# Patient Record
Sex: MALE | Race: WHITE | NOT HISPANIC OR LATINO | URBAN - METROPOLITAN AREA
[De-identification: names, ages, dates, MRNs, and addresses within clinical notes are randomized per-mention and may not be internally consistent; named-entity substitution may affect disease eponyms.]

---

## 2020-12-23 ENCOUNTER — OUTPATIENT (OUTPATIENT)
Dept: OUTPATIENT SERVICES | Facility: HOSPITAL | Age: 80
LOS: 1 days | Discharge: HOME | End: 2020-12-23
Payer: MEDICARE

## 2020-12-23 DIAGNOSIS — R10.9 UNSPECIFIED ABDOMINAL PAIN: ICD-10-CM

## 2020-12-23 PROCEDURE — 76775 US EXAM ABDO BACK WALL LIM: CPT | Mod: 26

## 2021-01-19 ENCOUNTER — APPOINTMENT (OUTPATIENT)
Dept: SURGERY | Facility: CLINIC | Age: 81
End: 2021-01-19
Payer: MEDICARE

## 2021-01-19 VITALS — HEIGHT: 65 IN | BODY MASS INDEX: 23.66 KG/M2 | WEIGHT: 142 LBS

## 2021-01-19 PROBLEM — Z00.00 ENCOUNTER FOR PREVENTIVE HEALTH EXAMINATION: Status: ACTIVE | Noted: 2021-01-19

## 2021-01-19 PROCEDURE — 99203 OFFICE O/P NEW LOW 30 MIN: CPT

## 2021-01-19 NOTE — ASSESSMENT
[FreeTextEntry1] : Fahad is a pleasant 80-year-old retired gentleman with no significant past medical history other than smoking one box of Italian cigars daily who presents to the office with intermittent discomfort and significant swelling in the right groin suspicious for a hernia. He enjoys doing physical activity, especially outdoors.\par \par Physical examination demonstrates a large tender plum-sized bulge in the right groin which is easily reducible consistent with a large protruding and intermittently symptomatic right inguinal hernia warranting surgical repair. There is no evidence of incarceration or strangulation, and the patient denies any symptoms of obstruction.  Examination of his left groin demonstrates a mild weakness but no obvious hernia. Both testicles are normal. His umbilical examination demonstrates a strawberry-sized bulge 1 fingerbreadth above the umbilicus which is tender and not reducible consistent with an incarcerated supraumbilical ventral hernia which deserves concomitant repair. His current BMI is 24.\par \par I explained the pros and cons of surgery, as well as all risks, benefits, indications and alternatives of the procedure and the patient understood and agreed. Fahad was scheduled for the repair of his right inguinal hernia with mesh in the repair of his incarcerated supraumbilical ventral hernia with mesh on Friday, February 19, 2021 under LOCAL with IV SEDATION at [facility] with presurgical testing waived.  He was encouraged to avoid heavy lifting and strenuous activity in the interim, of course.\par \par After our discussion, he is aware of the dangers of cigar smoking, especially with regard to wound healing, wound complications, hernia development and hernia recurrence.  He was encouraged to quit or minimize smoking in the perioperative period and has agreed to try.

## 2021-01-19 NOTE — PHYSICAL EXAM
[Normal Breath Sounds] : Normal breath sounds [No Rash or Lesion] : No rash or lesion [Alert] : alert [Calm] : calm [JVD] : no jugular venous distention  [de-identified] : healthy [de-identified] : normal [de-identified] : soft and flat abdomen\par  [de-identified] : normal testicles [de-identified] : right inguinal hernia, reducible; supraumbilical ventral hernia, incarcerated

## 2021-01-19 NOTE — CONSULT LETTER
[Dear  ___] : Dear  [unfilled], [Courtesy Letter:] : I had the pleasure of seeing your patient, [unfilled], in my office today. [Please see my note below.] : Please see my note below. [Consult Closing:] : Thank you very much for allowing me to participate in the care of this patient.  If you have any questions, please do not hesitate to contact me. [FreeTextEntry3] : Respectfully,\par \par Jeff Weaver M.D., FACS\par

## 2021-02-14 ENCOUNTER — OUTPATIENT (OUTPATIENT)
Dept: OUTPATIENT SERVICES | Facility: HOSPITAL | Age: 81
LOS: 1 days | Discharge: HOME | End: 2021-02-14

## 2021-02-14 ENCOUNTER — LABORATORY RESULT (OUTPATIENT)
Age: 81
End: 2021-02-14

## 2021-02-14 DIAGNOSIS — Z11.59 ENCOUNTER FOR SCREENING FOR OTHER VIRAL DISEASES: ICD-10-CM

## 2021-02-16 NOTE — ASU PATIENT PROFILE, ADULT - NSSUBSTANCEUSE_GEN_ALL_CORE_SD
Health Maintenance Due   Topic Date Due   • Cervical Cancer Screening  10/01/2014       Patient is due for topics as listed above but is not proceeding with Cervical cancer screening at this time.          never used

## 2021-02-17 ENCOUNTER — RESULT REVIEW (OUTPATIENT)
Age: 81
End: 2021-02-17

## 2021-02-17 ENCOUNTER — OUTPATIENT (OUTPATIENT)
Dept: OUTPATIENT SERVICES | Facility: HOSPITAL | Age: 81
LOS: 1 days | Discharge: HOME | End: 2021-02-17
Payer: MEDICARE

## 2021-02-17 ENCOUNTER — APPOINTMENT (OUTPATIENT)
Dept: SURGERY | Facility: AMBULATORY SURGERY CENTER | Age: 81
End: 2021-02-17
Payer: MEDICARE

## 2021-02-17 VITALS
TEMPERATURE: 98 F | OXYGEN SATURATION: 100 % | RESPIRATION RATE: 18 BRPM | HEART RATE: 60 BPM | SYSTOLIC BLOOD PRESSURE: 145 MMHG | DIASTOLIC BLOOD PRESSURE: 69 MMHG

## 2021-02-17 VITALS
HEIGHT: 65 IN | HEART RATE: 60 BPM | DIASTOLIC BLOOD PRESSURE: 77 MMHG | SYSTOLIC BLOOD PRESSURE: 155 MMHG | RESPIRATION RATE: 19 BRPM | TEMPERATURE: 98 F | WEIGHT: 141.98 LBS | OXYGEN SATURATION: 100 %

## 2021-02-17 DIAGNOSIS — Z90.89 ACQUIRED ABSENCE OF OTHER ORGANS: Chronic | ICD-10-CM

## 2021-02-17 PROCEDURE — 88304 TISSUE EXAM BY PATHOLOGIST: CPT | Mod: 26

## 2021-02-17 PROCEDURE — 55520 REMOVAL OF SPERM CORD LESION: CPT | Mod: RT,59

## 2021-02-17 PROCEDURE — 49505 PRP I/HERN INIT REDUC >5 YR: CPT | Mod: RT

## 2021-02-17 PROCEDURE — 49561: CPT

## 2021-02-17 PROCEDURE — 49568: CPT | Mod: 59

## 2021-02-17 RX ORDER — SODIUM CHLORIDE 9 MG/ML
1000 INJECTION, SOLUTION INTRAVENOUS
Refills: 0 | Status: DISCONTINUED | OUTPATIENT
Start: 2021-02-17 | End: 2021-03-03

## 2021-02-17 RX ORDER — TRAMADOL HYDROCHLORIDE 50 MG/1
1 TABLET ORAL
Qty: 30 | Refills: 0
Start: 2021-02-17 | End: 2021-02-21

## 2021-02-17 RX ORDER — HYDROMORPHONE HYDROCHLORIDE 2 MG/ML
0.5 INJECTION INTRAMUSCULAR; INTRAVENOUS; SUBCUTANEOUS
Refills: 0 | Status: DISCONTINUED | OUTPATIENT
Start: 2021-02-17 | End: 2021-02-17

## 2021-02-17 RX ORDER — ONDANSETRON 8 MG/1
4 TABLET, FILM COATED ORAL ONCE
Refills: 0 | Status: DISCONTINUED | OUTPATIENT
Start: 2021-02-17 | End: 2021-03-03

## 2021-02-17 RX ORDER — OXYCODONE AND ACETAMINOPHEN 5; 325 MG/1; MG/1
1 TABLET ORAL EVERY 4 HOURS
Refills: 0 | Status: DISCONTINUED | OUTPATIENT
Start: 2021-02-17 | End: 2021-02-17

## 2021-02-17 RX ADMIN — SODIUM CHLORIDE 100 MILLILITER(S): 9 INJECTION, SOLUTION INTRAVENOUS at 10:58

## 2021-02-17 NOTE — CHART NOTE - NSCHARTNOTEFT_GEN_A_CORE
PACU ANESTHESIA ADMISSION NOTE      Procedure: Repair of incarcerated ventral hernia with mesh    Repair of right inguinal hernia with mesh      Post op diagnosis:  Incarcerated ventral hernia    Inguinal hernia, right        ____  Intubated  TV:______       Rate: ______      FiO2: ______    __x__  Patent Airway    __x__  Full return of protective reflexes    __x__  Full recovery from anesthesia / back to baseline     Vitals:   See Anesthesia record  T- 98.0 P-55 R- 15 B/P- 109/58 SPO2- 95% on RA    Mental Status:  __x__ Awake   _____ Alert   __x___ Drowsy   _____ Sedated    Nausea/Vomiting:  __x__ NO  ______Yes,   See Post - Op Orders          Pain Scale (0-10):  ___0__    Treatment: ____ None    ____ See Post - Op/PCA Orders    Post - Operative Fluids:   ____ Oral   __x__ See Post - Op Orders    Plan: Discharge:   __x__Home       _____Floor     _____Critical Care    _____  Other:_________________    Comments: No anesthesia complications/issues noted. Discharge to HOME when criteria met.

## 2021-02-17 NOTE — ASU DISCHARGE PLAN (ADULT/PEDIATRIC) - DISCHARGE TO
Completed a ED CM readmission questionnaire for COPD/ CHF.  Mickie Rankin RN.6/5/2017.9:48 PM.  
Home

## 2021-02-17 NOTE — BRIEF OPERATIVE NOTE - NSICDXBRIEFPREOP_GEN_ALL_CORE_FT
PRE-OP DIAGNOSIS:  Incarcerated ventral hernia 17-Feb-2021 09:04:50  Jeff Weaver  Inguinal hernia, right 17-Feb-2021 09:04:47  Jeff Weaver

## 2021-02-17 NOTE — BRIEF OPERATIVE NOTE - NSICDXBRIEFPROCEDURE_GEN_ALL_CORE_FT
PROCEDURES:  Repair of incarcerated ventral hernia with mesh 17-Feb-2021 09:05:02  Jeff Weaver  Repair of right inguinal hernia with mesh 17-Feb-2021 09:05:00  Jeff Weaver

## 2021-02-17 NOTE — BRIEF OPERATIVE NOTE - NSICDXBRIEFPOSTOP_GEN_ALL_CORE_FT
POST-OP DIAGNOSIS:  Incarcerated ventral hernia 17-Feb-2021 09:04:56  Jeff Weaver  Inguinal hernia, right 17-Feb-2021 09:04:53  Jeff Weaver

## 2021-02-17 NOTE — PRE-ANESTHESIA EVALUATION ADULT - NSANTHOSAYNRD_GEN_A_CORE
No. TATYANA screening performed.  STOP BANG Legend: 0-2 = LOW Risk; 3-4 = INTERMEDIATE Risk; 5-8 = HIGH Risk

## 2021-02-17 NOTE — ASU DISCHARGE PLAN (ADULT/PEDIATRIC) - CARE PROVIDER_API CALL
Jeff Weaver)  Surgery  [address]  [place] [address]  Phone: [phone]  Fax: [phone]  Scheduled Appointment: 02/25/2021

## 2021-02-19 LAB — SURGICAL PATHOLOGY STUDY: SIGNIFICANT CHANGE UP

## 2021-02-20 DIAGNOSIS — K40.90 UNILATERAL INGUINAL HERNIA, WITHOUT OBSTRUCTION OR GANGRENE, NOT SPECIFIED AS RECURRENT: ICD-10-CM

## 2021-02-20 DIAGNOSIS — D17.6 BENIGN LIPOMATOUS NEOPLASM OF SPERMATIC CORD: ICD-10-CM

## 2021-02-20 DIAGNOSIS — F17.200 NICOTINE DEPENDENCE, UNSPECIFIED, UNCOMPLICATED: ICD-10-CM

## 2021-02-20 DIAGNOSIS — K43.6 OTHER AND UNSPECIFIED VENTRAL HERNIA WITH OBSTRUCTION, WITHOUT GANGRENE: ICD-10-CM

## 2021-02-23 ENCOUNTER — APPOINTMENT (OUTPATIENT)
Dept: SURGERY | Facility: CLINIC | Age: 81
End: 2021-02-23
Payer: MEDICARE

## 2021-02-23 DIAGNOSIS — K43.6 OTHER AND UNSPECIFIED VENTRAL HERNIA WITH OBSTRUCTION, W/OUT GANGRENE: ICD-10-CM

## 2021-02-23 DIAGNOSIS — K40.90 UNILATERAL INGUINAL HERNIA, W/OUT OBSTRUCTION OR GANGRENE, NOT SPECIFIED AS RECURRENT: ICD-10-CM

## 2021-02-23 PROBLEM — H40.9 UNSPECIFIED GLAUCOMA: Chronic | Status: ACTIVE | Noted: 2021-02-17

## 2021-02-23 PROCEDURE — 99024 POSTOP FOLLOW-UP VISIT: CPT

## 2021-02-23 NOTE — ASSESSMENT
[FreeTextEntry1] : Fahad underwent the repair of his very large direct right inguinal hernia with mesh and repair of his incarcerated supraumbilical ventral hernia with mesh on February 17, 2021 under local with IV sedation without any problems or complications. His wound is clean, dry and intact. There is no evidence of erythema, seroma formation or infection. He is tolerating a diet and having normal bowel movements. He denies any significant postoperative pain or discomfort at this time.\par \par He was counseled and reassured. Fahad was discharged from the office with no specific followup necessary, but he knows to avoid any heavy lifting or strenuous activity for the next several weeks. He was encouraged to continue to wear his abdominal binder for the better part of the next month.

## 2021-02-23 NOTE — CONSULT LETTER
[FreeTextEntry1] : Dear Dr. Juan Alberto Fernandez, \par \par I had the pleasure of seeing your patient, CHRISTINA CROCKETT, in my office today. Please see my note below. \par \par Thank you very much for allowing me to participate in the care of this patient. If you have any questions, please do not hesitate to contact me. \par \par \par Respectfully,\par \par Jeff Weaver M.D., FACS

## 2021-02-25 ENCOUNTER — APPOINTMENT (OUTPATIENT)
Dept: SURGERY | Facility: CLINIC | Age: 81
End: 2021-02-25

## 2021-05-04 ENCOUNTER — RESULT REVIEW (OUTPATIENT)
Age: 81
End: 2021-05-04

## 2021-05-04 ENCOUNTER — OUTPATIENT (OUTPATIENT)
Dept: OUTPATIENT SERVICES | Facility: HOSPITAL | Age: 81
LOS: 1 days | Discharge: HOME | End: 2021-05-04
Payer: MEDICARE

## 2021-05-04 VITALS
OXYGEN SATURATION: 97 % | HEIGHT: 65 IN | HEART RATE: 60 BPM | SYSTOLIC BLOOD PRESSURE: 170 MMHG | RESPIRATION RATE: 16 BRPM | TEMPERATURE: 96 F | WEIGHT: 149.91 LBS | DIASTOLIC BLOOD PRESSURE: 80 MMHG

## 2021-05-04 DIAGNOSIS — M17.11 UNILATERAL PRIMARY OSTEOARTHRITIS, RIGHT KNEE: ICD-10-CM

## 2021-05-04 DIAGNOSIS — Z01.818 ENCOUNTER FOR OTHER PREPROCEDURAL EXAMINATION: ICD-10-CM

## 2021-05-04 DIAGNOSIS — Z90.89 ACQUIRED ABSENCE OF OTHER ORGANS: Chronic | ICD-10-CM

## 2021-05-04 LAB
A1C WITH ESTIMATED AVERAGE GLUCOSE RESULT: 5.8 % — HIGH (ref 4–5.6)
ALBUMIN SERPL ELPH-MCNC: 4.5 G/DL — SIGNIFICANT CHANGE UP (ref 3.5–5.2)
ALP SERPL-CCNC: 82 U/L — SIGNIFICANT CHANGE UP (ref 30–115)
ALT FLD-CCNC: 10 U/L — SIGNIFICANT CHANGE UP (ref 0–41)
ANION GAP SERPL CALC-SCNC: 12 MMOL/L — SIGNIFICANT CHANGE UP (ref 7–14)
APTT BLD: 31.2 SEC — SIGNIFICANT CHANGE UP (ref 27–39.2)
AST SERPL-CCNC: 18 U/L — SIGNIFICANT CHANGE UP (ref 0–41)
BASOPHILS # BLD AUTO: 0.07 K/UL — SIGNIFICANT CHANGE UP (ref 0–0.2)
BASOPHILS NFR BLD AUTO: 1 % — SIGNIFICANT CHANGE UP (ref 0–1)
BILIRUB SERPL-MCNC: 0.6 MG/DL — SIGNIFICANT CHANGE UP (ref 0.2–1.2)
BUN SERPL-MCNC: 16 MG/DL — SIGNIFICANT CHANGE UP (ref 10–20)
CALCIUM SERPL-MCNC: 9.7 MG/DL — SIGNIFICANT CHANGE UP (ref 8.5–10.1)
CHLORIDE SERPL-SCNC: 101 MMOL/L — SIGNIFICANT CHANGE UP (ref 98–110)
CO2 SERPL-SCNC: 23 MMOL/L — SIGNIFICANT CHANGE UP (ref 17–32)
CREAT SERPL-MCNC: 0.7 MG/DL — SIGNIFICANT CHANGE UP (ref 0.7–1.5)
EOSINOPHIL # BLD AUTO: 0.29 K/UL — SIGNIFICANT CHANGE UP (ref 0–0.7)
EOSINOPHIL NFR BLD AUTO: 4 % — SIGNIFICANT CHANGE UP (ref 0–8)
ESTIMATED AVERAGE GLUCOSE: 120 MG/DL — HIGH (ref 68–114)
GLUCOSE SERPL-MCNC: 103 MG/DL — HIGH (ref 70–99)
HCT VFR BLD CALC: 38.4 % — LOW (ref 42–52)
HGB BLD-MCNC: 13.6 G/DL — LOW (ref 14–18)
IMM GRANULOCYTES NFR BLD AUTO: 0.5 % — HIGH (ref 0.1–0.3)
INR BLD: 0.99 RATIO — SIGNIFICANT CHANGE UP (ref 0.65–1.3)
LYMPHOCYTES # BLD AUTO: 1.63 K/UL — SIGNIFICANT CHANGE UP (ref 1.2–3.4)
LYMPHOCYTES # BLD AUTO: 22.3 % — SIGNIFICANT CHANGE UP (ref 20.5–51.1)
MCHC RBC-ENTMCNC: 30.4 PG — SIGNIFICANT CHANGE UP (ref 27–31)
MCHC RBC-ENTMCNC: 35.4 G/DL — SIGNIFICANT CHANGE UP (ref 32–37)
MCV RBC AUTO: 85.7 FL — SIGNIFICANT CHANGE UP (ref 80–94)
MONOCYTES # BLD AUTO: 0.75 K/UL — HIGH (ref 0.1–0.6)
MONOCYTES NFR BLD AUTO: 10.2 % — HIGH (ref 1.7–9.3)
MRSA PCR RESULT.: NEGATIVE — SIGNIFICANT CHANGE UP
NEUTROPHILS # BLD AUTO: 4.54 K/UL — SIGNIFICANT CHANGE UP (ref 1.4–6.5)
NEUTROPHILS NFR BLD AUTO: 62 % — SIGNIFICANT CHANGE UP (ref 42.2–75.2)
NRBC # BLD: 0 /100 WBCS — SIGNIFICANT CHANGE UP (ref 0–0)
PLATELET # BLD AUTO: 226 K/UL — SIGNIFICANT CHANGE UP (ref 130–400)
POTASSIUM SERPL-MCNC: 4.4 MMOL/L — SIGNIFICANT CHANGE UP (ref 3.5–5)
POTASSIUM SERPL-SCNC: 4.4 MMOL/L — SIGNIFICANT CHANGE UP (ref 3.5–5)
PROT SERPL-MCNC: 7.4 G/DL — SIGNIFICANT CHANGE UP (ref 6–8)
PROTHROM AB SERPL-ACNC: 11.4 SEC — SIGNIFICANT CHANGE UP (ref 9.95–12.87)
RBC # BLD: 4.48 M/UL — LOW (ref 4.7–6.1)
RBC # FLD: 12 % — SIGNIFICANT CHANGE UP (ref 11.5–14.5)
SODIUM SERPL-SCNC: 136 MMOL/L — SIGNIFICANT CHANGE UP (ref 135–146)
WBC # BLD: 7.32 K/UL — SIGNIFICANT CHANGE UP (ref 4.8–10.8)
WBC # FLD AUTO: 7.32 K/UL — SIGNIFICANT CHANGE UP (ref 4.8–10.8)

## 2021-05-04 PROCEDURE — 71046 X-RAY EXAM CHEST 2 VIEWS: CPT | Mod: 26

## 2021-05-04 PROCEDURE — 72170 X-RAY EXAM OF PELVIS: CPT | Mod: 26

## 2021-05-04 PROCEDURE — 73562 X-RAY EXAM OF KNEE 3: CPT | Mod: 26,RT

## 2021-05-04 PROCEDURE — 93010 ELECTROCARDIOGRAM REPORT: CPT

## 2021-05-04 NOTE — H&P PST ADULT - REASON FOR ADMISSION
right total knee replacement scheduled on 5/ 19 under regional anesthesia at [facility] by Dr Edmonds

## 2021-05-04 NOTE — H&P PST ADULT - HISTORY OF PRESENT ILLNESS
79 yo male presents for PAST in preparation for right total knee replacement scheduled on 5/ 19  Pt complains of intermittent chronic RIGHT knee pain for 8 years. Pt reports pain 7 /10 with rest, and  9/10 with activity. Pt takes  nothing for pain.  Pain is localized and doesn't  radiates to other parts of the body. Associated symptom  is decrease mobility.    Anesthesia Alert  NO--Difficult Airway  NO--History of neck surgery or radiation  NO--Limited ROM of neck  NO--History of Malignant hyperthermia  NO--Personal or family history of Pseudocholinesterase deficiency.  NO--Prior Anesthesia Complication  NO--Latex Allergy  NO--Loose teeth  NO--History of Rheumatoid Arthritis  NO--TATYANA  NO--Bleeding risk  NO--Other     written and verbal instructions with teach back on chlorhexidine shampoo provided,  pt verbalized understanding with returned demonstration  Pt instructed to stop vitamins/supplements/herbal medications/ASA/NSAIDS for one week prior to surgery and discuss with PMD.  Patient verbalized understanding of instructions and was given the opportunity to ask questions and have them answered.     79 yo male presents for PAST in preparation for right total knee replacement scheduled on 5/ 19  Pt complains of intermittent chronic RIGHT knee pain for 8 years. Pt reports pain 7 /10 with rest, and  9/10 with activity. Pt takes  nothing for pain, but uses ice packs with some relief.  Pain is localized and doesn't  radiates to other parts of the body. Associated symptom  is decrease mobility.    Anesthesia Alert  NO--Difficult Airway  NO--History of neck surgery or radiation  NO--Limited ROM of neck  NO--History of Malignant hyperthermia  NO--Personal or family history of Pseudocholinesterase deficiency.  NO--Prior Anesthesia Complication  NO--Latex Allergy  NO--Loose teeth  NO--History of Rheumatoid Arthritis  NO--ATTYANA  NO--Bleeding risk  NO--Other     written and verbal instructions with teach back on chlorhexidine shampoo provided,  pt verbalized understanding with returned demonstration  Pt instructed to stop vitamins/supplements/herbal medications/ASA/NSAIDS for one week prior to surgery and discuss with PMD.  Patient verbalized understanding of instructions and was given the opportunity to ask questions and have them answered.

## 2021-05-04 NOTE — H&P PST ADULT - NSICDXPASTMEDICALHX_GEN_ALL_CORE_FT
PAST MEDICAL HISTORY:  Glaucoma (increased eye pressure) bilateral eyes    H/O inguinal hernia     Hernia, umbilical     Right knee pain      PAST MEDICAL HISTORY:  Glaucoma (increased eye pressure) bilateral eyes    H/O inguinal hernia     Hernia, umbilical     [place] (hard of hearing)     Right knee pain

## 2021-05-04 NOTE — H&P PST ADULT - RS GEN PE MLT RESP DETAILS PC
airway patent/breath sounds equal/good air movement/respirations non-labored/clear to auscultation bilaterally/no chest wall tenderness/no intercostal retractions/no rales

## 2021-05-13 NOTE — ASU PREOP CHECKLIST - WEIGHT IN KG
64.4 Anesthesia Type: 2% lidocaine with epinephrine mixed 1:10 with a solution of 8.4% sodium bicarbonate and 0.25% marcaine with epinephrine

## 2021-05-16 ENCOUNTER — OUTPATIENT (OUTPATIENT)
Dept: OUTPATIENT SERVICES | Facility: HOSPITAL | Age: 81
LOS: 1 days | Discharge: HOME | End: 2021-05-16

## 2021-05-16 DIAGNOSIS — Z90.89 ACQUIRED ABSENCE OF OTHER ORGANS: Chronic | ICD-10-CM

## 2021-05-16 DIAGNOSIS — Z11.59 ENCOUNTER FOR SCREENING FOR OTHER VIRAL DISEASES: ICD-10-CM

## 2021-05-16 PROBLEM — K42.9 UMBILICAL HERNIA WITHOUT OBSTRUCTION OR GANGRENE: Chronic | Status: ACTIVE | Noted: 2021-05-04

## 2021-05-16 PROBLEM — Z87.19 PERSONAL HISTORY OF OTHER DISEASES OF THE DIGESTIVE SYSTEM: Chronic | Status: ACTIVE | Noted: 2021-05-04

## 2021-05-16 PROBLEM — M25.561 PAIN IN RIGHT KNEE: Chronic | Status: ACTIVE | Noted: 2021-05-04

## 2021-05-16 PROBLEM — H91.90 UNSPECIFIED HEARING LOSS, UNSPECIFIED EAR: Chronic | Status: ACTIVE | Noted: 2021-05-04

## 2021-05-18 ENCOUNTER — FORM ENCOUNTER (OUTPATIENT)
Age: 81
End: 2021-05-18

## 2021-05-18 NOTE — ASU PATIENT PROFILE, ADULT - PMH
Glaucoma (increased eye pressure)  bilateral eyes  H/O inguinal hernia    Hernia, umbilical    [place] (hard of hearing)    Right knee pain

## 2021-05-19 ENCOUNTER — OUTPATIENT (OUTPATIENT)
Dept: OUTPATIENT SERVICES | Facility: HOSPITAL | Age: 81
LOS: 1 days | Discharge: HOME | End: 2021-05-19

## 2021-05-19 ENCOUNTER — INPATIENT (INPATIENT)
Facility: HOSPITAL | Age: 81
LOS: 0 days | Discharge: HOME HEALTH | End: 2021-05-20
Attending: ORTHOPAEDIC SURGERY | Admitting: ORTHOPAEDIC SURGERY
Payer: MEDICARE

## 2021-05-19 ENCOUNTER — RESULT REVIEW (OUTPATIENT)
Age: 81
End: 2021-05-19

## 2021-05-19 VITALS
TEMPERATURE: 99 F | HEART RATE: 63 BPM | WEIGHT: 141.1 LBS | SYSTOLIC BLOOD PRESSURE: 177 MMHG | DIASTOLIC BLOOD PRESSURE: 74 MMHG | OXYGEN SATURATION: 99 % | RESPIRATION RATE: 18 BRPM | HEIGHT: 65 IN

## 2021-05-19 DIAGNOSIS — M17.10 UNILATERAL PRIMARY OSTEOARTHRITIS, UNSPECIFIED KNEE: ICD-10-CM

## 2021-05-19 DIAGNOSIS — Z90.89 ACQUIRED ABSENCE OF OTHER ORGANS: Chronic | ICD-10-CM

## 2021-05-19 PROCEDURE — 88311 DECALCIFY TISSUE: CPT | Mod: 26

## 2021-05-19 PROCEDURE — 88304 TISSUE EXAM BY PATHOLOGIST: CPT | Mod: 26

## 2021-05-19 PROCEDURE — 99222 1ST HOSP IP/OBS MODERATE 55: CPT

## 2021-05-19 PROCEDURE — 73560 X-RAY EXAM OF KNEE 1 OR 2: CPT | Mod: 26,RT

## 2021-05-19 RX ORDER — SODIUM CHLORIDE 9 MG/ML
1000 INJECTION, SOLUTION INTRAVENOUS
Refills: 0 | Status: DISCONTINUED | OUTPATIENT
Start: 2021-05-19 | End: 2021-05-19

## 2021-05-19 RX ORDER — TRAMADOL HYDROCHLORIDE 50 MG/1
50 TABLET ORAL EVERY 4 HOURS
Refills: 0 | Status: DISCONTINUED | OUTPATIENT
Start: 2021-05-19 | End: 2021-05-20

## 2021-05-19 RX ORDER — ASPIRIN/CALCIUM CARB/MAGNESIUM 324 MG
81 TABLET ORAL
Refills: 0 | Status: DISCONTINUED | OUTPATIENT
Start: 2021-05-20 | End: 2021-05-20

## 2021-05-19 RX ORDER — CELECOXIB 200 MG/1
200 CAPSULE ORAL ONCE
Refills: 0 | Status: COMPLETED | OUTPATIENT
Start: 2021-05-19 | End: 2021-05-19

## 2021-05-19 RX ORDER — ONDANSETRON 8 MG/1
4 TABLET, FILM COATED ORAL ONCE
Refills: 0 | Status: DISCONTINUED | OUTPATIENT
Start: 2021-05-19 | End: 2021-05-19

## 2021-05-19 RX ORDER — ONDANSETRON 8 MG/1
4 TABLET, FILM COATED ORAL EVERY 6 HOURS
Refills: 0 | Status: DISCONTINUED | OUTPATIENT
Start: 2021-05-19 | End: 2021-05-20

## 2021-05-19 RX ORDER — PANTOPRAZOLE SODIUM 20 MG/1
40 TABLET, DELAYED RELEASE ORAL
Refills: 0 | Status: DISCONTINUED | OUTPATIENT
Start: 2021-05-19 | End: 2021-05-20

## 2021-05-19 RX ORDER — ALPRAZOLAM 0.25 MG
0.25 TABLET ORAL
Refills: 0 | Status: DISCONTINUED | OUTPATIENT
Start: 2021-05-19 | End: 2021-05-20

## 2021-05-19 RX ORDER — CEFAZOLIN SODIUM 1 G
2000 VIAL (EA) INJECTION EVERY 8 HOURS
Refills: 0 | Status: COMPLETED | OUTPATIENT
Start: 2021-05-19 | End: 2021-05-20

## 2021-05-19 RX ORDER — SENNA PLUS 8.6 MG/1
2 TABLET ORAL AT BEDTIME
Refills: 0 | Status: DISCONTINUED | OUTPATIENT
Start: 2021-05-19 | End: 2021-05-20

## 2021-05-19 RX ORDER — CELECOXIB 200 MG/1
200 CAPSULE ORAL EVERY 12 HOURS
Refills: 0 | Status: DISCONTINUED | OUTPATIENT
Start: 2021-05-20 | End: 2021-05-20

## 2021-05-19 RX ORDER — KETOROLAC TROMETHAMINE 30 MG/ML
15 SYRINGE (ML) INJECTION EVERY 6 HOURS
Refills: 0 | Status: DISCONTINUED | OUTPATIENT
Start: 2021-05-19 | End: 2021-05-20

## 2021-05-19 RX ORDER — HYDROMORPHONE HYDROCHLORIDE 2 MG/ML
0.5 INJECTION INTRAMUSCULAR; INTRAVENOUS; SUBCUTANEOUS
Refills: 0 | Status: DISCONTINUED | OUTPATIENT
Start: 2021-05-19 | End: 2021-05-19

## 2021-05-19 RX ORDER — ACETAMINOPHEN 500 MG
1000 TABLET ORAL ONCE
Refills: 0 | Status: COMPLETED | OUTPATIENT
Start: 2021-05-19 | End: 2021-05-19

## 2021-05-19 RX ORDER — LATANOPROST 0.05 MG/ML
1 SOLUTION/ DROPS OPHTHALMIC; TOPICAL AT BEDTIME
Refills: 0 | Status: DISCONTINUED | OUTPATIENT
Start: 2021-05-19 | End: 2021-05-20

## 2021-05-19 RX ORDER — CHLORHEXIDINE GLUCONATE 213 G/1000ML
1 SOLUTION TOPICAL
Refills: 0 | Status: DISCONTINUED | OUTPATIENT
Start: 2021-05-19 | End: 2021-05-20

## 2021-05-19 RX ORDER — HYDROMORPHONE HYDROCHLORIDE 2 MG/ML
1 INJECTION INTRAMUSCULAR; INTRAVENOUS; SUBCUTANEOUS
Refills: 0 | Status: DISCONTINUED | OUTPATIENT
Start: 2021-05-19 | End: 2021-05-19

## 2021-05-19 RX ORDER — ACETAMINOPHEN 500 MG
650 TABLET ORAL EVERY 6 HOURS
Refills: 0 | Status: DISCONTINUED | OUTPATIENT
Start: 2021-05-19 | End: 2021-05-20

## 2021-05-19 RX ORDER — SODIUM CHLORIDE 9 MG/ML
1000 INJECTION INTRAMUSCULAR; INTRAVENOUS; SUBCUTANEOUS
Refills: 0 | Status: DISCONTINUED | OUTPATIENT
Start: 2021-05-19 | End: 2021-05-20

## 2021-05-19 RX ADMIN — CELECOXIB 200 MILLIGRAM(S): 200 CAPSULE ORAL at 10:00

## 2021-05-19 RX ADMIN — SODIUM CHLORIDE 100 MILLILITER(S): 9 INJECTION INTRAMUSCULAR; INTRAVENOUS; SUBCUTANEOUS at 17:57

## 2021-05-19 RX ADMIN — Medication 15 MILLIGRAM(S): at 17:57

## 2021-05-19 RX ADMIN — Medication 100 MILLIGRAM(S): at 21:07

## 2021-05-19 RX ADMIN — TRAMADOL HYDROCHLORIDE 50 MILLIGRAM(S): 50 TABLET ORAL at 21:18

## 2021-05-19 RX ADMIN — SENNA PLUS 2 TABLET(S): 8.6 TABLET ORAL at 21:07

## 2021-05-19 RX ADMIN — Medication 1000 MILLIGRAM(S): at 09:59

## 2021-05-19 RX ADMIN — LATANOPROST 1 DROP(S): 0.05 SOLUTION/ DROPS OPHTHALMIC; TOPICAL at 21:06

## 2021-05-19 RX ADMIN — Medication 15 MILLIGRAM(S): at 18:31

## 2021-05-19 RX ADMIN — TRAMADOL HYDROCHLORIDE 50 MILLIGRAM(S): 50 TABLET ORAL at 22:32

## 2021-05-19 NOTE — PHYSICAL THERAPY INITIAL EVALUATION ADULT - ADDITIONAL COMMENTS
As per patient , resides with his daughter and grandchildren in a private home.  6 steps to enter, with railing on both sides ; 15 steps to second floor of home , however , patient plans to remain on 1st level only. Ambulating independently prior to this surgery , will need a rolling walker for D/C

## 2021-05-19 NOTE — PHYSICAL THERAPY INITIAL EVALUATION ADULT - GENERAL OBSERVATIONS, REHAB EVAL
16:55 - 15:20. Chart reviewed. Patient available to be seen for physical therapy, confirmed with nurse. Patient encountered semi-reclined in bed in PACU, +aquacel (R) knee, +IV (disconnected by RN for session). Denies pain at rest and is ready for PT evaluation now. 16:55 - 17:20. Chart reviewed. Patient available to be seen for physical therapy, confirmed with nurse. Patient encountered semi-reclined in bed in PACU, +aquacel (R) knee, +IV (disconnected by RN for session). Denies pain at rest and is ready for PT evaluation now.

## 2021-05-19 NOTE — CONSULT NOTE ADULT - SUBJECTIVE AND OBJECTIVE BOX
CC. S/P right total knee replacement  HPI.  Patient reports right knee pain is controlled.  Offers no other complaints    Constitutional: No fever, fatigue or weight loss.  Skin: No rash.  Eyes: No recent vision problems or eye pain.  ENT: No congestion, ear pain, or sore throat.  Endocrine: No thyroid problems.  Cardiovascular: No chest pain or palpation.  Respiratory: No cough, shortness of breath, congestion, or wheezing.  Gastrointestinal: No abdominal pain, nausea, vomiting, or diarrhea.  Genitourinary: No dysuria.  Musculoskeletal: No joint swelling.  Neurologic: No headache.           Allergies/Medications:   Allergies:        Allergies:  	No Known Allergies:     Home Medications:   * Patient Currently Takes Medications as of 17-Feb-2021 09:06 documented in Structured Notes  · 	traMADol 50 mg oral tablet: 1-2 tab(s) orally every 4 hours as needed for moderate to severe pain. MDD:6  · 	Travatan 0.004% ophthalmic solution: 1 drop(s) to each affected eye once a day (in the evening)  · 	Combigan: to each affected eye once a day    PMH/PSH/FH/SH:    Past Medical, Past Surgical, and Family History:  PAST MEDICAL HISTORY:  Glaucoma (increased eye pressure) bilateral eyes    H/O inguinal hernia     Hernia, umbilical     [place] (hard of hearing)     Right knee pain.     PAST SURGICAL HISTORY:  History of tonsillectomy years ago.     Social History:  · Marital Status	  · Lives With	children; daughter and grandchildren     Substance Use History:  · Substance Use	never used     Alcohol Use History:  · Have you ever consumed alcohol	never     Tobacco Usage:  · Tobacco Usage: Current some day smoker  · Tobacco Type: cigars  · Tobacco/Cigar Amount: occasional use  · Readiness to Quit Tobacco Use: not motivated to quit    Vital Signs Last 24 Hrs  T(C): 36.5 (19 May 2021 15:29), Max: 37 (19 May 2021 09:47)  T(F): 97.7 (19 May 2021 15:29), Max: 98.6 (19 May 2021 09:47)  HR: 76 (19 May 2021 17:00) (54 - 76)  BP: 133/66 (19 May 2021 17:00) (111/69 - 177/74)  BP(mean): --  RR: 18 (19 May 2021 17:00) (16 - 23)  SpO2: 98% (19 May 2021 17:00) (93% - 99%)                 PHYSICAL EXAM-  GENERAL: NAD, well-groomed, well-developed  HEAD:  Atraumatic, Normocephalic  EYES: EOMI, PERRLA, conjunctiva and sclera clear  NECK: Supple, No JVD, Normal thyroid  NERVOUS SYSTEM:  Alert & Oriented X3, Motor Strength 5/5 B/L upper and lower extremities; DTRs 2+ intact and symmetric  CHEST/LUNG: Clear to percussion bilaterally; No rales, rhonchi, wheezing, or rubs  HEART: Regular rate and rhythm; No murmurs, rubs, or gallops  ABDOMEN: Soft, Nontender, Nondistended; Bowel sounds present  EXTREMITIES:  2+ Peripheral Pulses, No clubbing, cyanosis, or edema  SKIN: No rashes or lesions        Imaging Personally Reviewed:     [x ] YES  [ ] NO    Consultant(s) Notes Reviewed:  [x ] YES  [ ] NO    Care Discussed with Consultants/Other Providers [x ] YES  [ ] No medical contraindication for discharge

## 2021-05-19 NOTE — BRIEF OPERATIVE NOTE - COMMENTS
[employer] [place] tka sandra press fit   vanco powder placed above and below the fascia total 2 grams    wbat pain cocktail given asa for dvt prophylaxis   tq time 60 min

## 2021-05-19 NOTE — PHYSICAL THERAPY INITIAL EVALUATION ADULT - LEVEL OF INDEPENDENCE: STAIR NEGOTIATION, REHAB EVAL
N/A - not performed at evaluation , patient still in PACU  ; as agreed with patient , will perform stair training tomorrow AM

## 2021-05-19 NOTE — PHYSICAL THERAPY INITIAL EVALUATION ADULT - RANGE OF MOTION EXAMINATION, REHAB EVAL
(R) knee 3 - 90 degrees , rest RLE and LLE WFL grossly throughout , assessed in semi-reclined position

## 2021-05-19 NOTE — CHART NOTE - NSCHARTNOTEFT_GEN_A_CORE
PACU ANESTHESIA ADMISSION NOTE      Procedure: TKA (total knee arthroplasty)      Post op diagnosis:  OA (osteoarthritis) of knee            x____  Patent Airway    _x___  Full return of protective reflexes    __x__  Full recovery from anesthesia / back to baseline     Vitals:   T:   97.7        R: 14                 BP:    111/69             Sat:    98%               P: 63      Mental Status:  ___x_ Awake   __x___ Alert   _____ Drowsy   _____ Sedated    Nausea/Vomiting: x____ NO  ______Yes,   See Post - Op Orders          Pain Scale (0-10):  __0___    Treatment: ____ None   x ____ See Post - Op/PCA Orders    Post - Operative Fluids:   x___ Oral   ____ See Post - Op Orders    Plan: Discharge:   __x__Home       _____Floor     _____Critical Care    _____  Other:_________________    Comments:

## 2021-05-19 NOTE — CONSULT NOTE ADULT - ASSESSMENT
Patient is 81 yo male presenting with     s/p Total knee replacement  Continue with pain management, DVT proph, and wound care as per Ortho.  PT/OT      glucome  Continue with home medications      #Progress Note Handoff  Pending (specify):  further care as per ortho  Family discussion:  plan of care was discussed with patient in details.  all questions were answered.  seems to understand, and in agreement  Disposition:  unknown

## 2021-05-19 NOTE — PHYSICAL THERAPY INITIAL EVALUATION ADULT - GAIT DEVIATIONS NOTED, PT EVAL
decreased heel strike / push off/decreased ken/increased time in double stance/decreased step length/decreased weight-shifting ability

## 2021-05-20 ENCOUNTER — TRANSCRIPTION ENCOUNTER (OUTPATIENT)
Age: 81
End: 2021-05-20

## 2021-05-20 VITALS
DIASTOLIC BLOOD PRESSURE: 66 MMHG | TEMPERATURE: 97 F | SYSTOLIC BLOOD PRESSURE: 131 MMHG | RESPIRATION RATE: 18 BRPM | HEART RATE: 68 BPM

## 2021-05-20 DIAGNOSIS — Z02.9 ENCOUNTER FOR ADMINISTRATIVE EXAMINATIONS, UNSPECIFIED: ICD-10-CM

## 2021-05-20 LAB
ANION GAP SERPL CALC-SCNC: 11 MMOL/L — SIGNIFICANT CHANGE UP (ref 7–14)
BUN SERPL-MCNC: 15 MG/DL — SIGNIFICANT CHANGE UP (ref 10–20)
CALCIUM SERPL-MCNC: 9 MG/DL — SIGNIFICANT CHANGE UP (ref 8.5–10.1)
CHLORIDE SERPL-SCNC: 100 MMOL/L — SIGNIFICANT CHANGE UP (ref 98–110)
CO2 SERPL-SCNC: 25 MMOL/L — SIGNIFICANT CHANGE UP (ref 17–32)
COVID-19 SPIKE DOMAIN AB INTERP: POSITIVE
COVID-19 SPIKE DOMAIN ANTIBODY RESULT: 103 U/ML — HIGH
CREAT SERPL-MCNC: 0.8 MG/DL — SIGNIFICANT CHANGE UP (ref 0.7–1.5)
GLUCOSE SERPL-MCNC: 110 MG/DL — HIGH (ref 70–99)
HCT VFR BLD CALC: 31.3 % — LOW (ref 42–52)
HGB BLD-MCNC: 11 G/DL — LOW (ref 14–18)
MCHC RBC-ENTMCNC: 30.5 PG — SIGNIFICANT CHANGE UP (ref 27–31)
MCHC RBC-ENTMCNC: 35.1 G/DL — SIGNIFICANT CHANGE UP (ref 32–37)
MCV RBC AUTO: 86.7 FL — SIGNIFICANT CHANGE UP (ref 80–94)
NRBC # BLD: 0 /100 WBCS — SIGNIFICANT CHANGE UP (ref 0–0)
PLATELET # BLD AUTO: 203 K/UL — SIGNIFICANT CHANGE UP (ref 130–400)
POTASSIUM SERPL-MCNC: 4.2 MMOL/L — SIGNIFICANT CHANGE UP (ref 3.5–5)
POTASSIUM SERPL-SCNC: 4.2 MMOL/L — SIGNIFICANT CHANGE UP (ref 3.5–5)
RBC # BLD: 3.61 M/UL — LOW (ref 4.7–6.1)
RBC # FLD: 11.7 % — SIGNIFICANT CHANGE UP (ref 11.5–14.5)
SARS-COV-2 IGG+IGM SERPL QL IA: 103 U/ML — HIGH
SARS-COV-2 IGG+IGM SERPL QL IA: POSITIVE
SODIUM SERPL-SCNC: 136 MMOL/L — SIGNIFICANT CHANGE UP (ref 135–146)
WBC # BLD: 10.98 K/UL — HIGH (ref 4.8–10.8)
WBC # FLD AUTO: 10.98 K/UL — HIGH (ref 4.8–10.8)

## 2021-05-20 RX ORDER — ASPIRIN/CALCIUM CARB/MAGNESIUM 324 MG
1 TABLET ORAL
Qty: 70 | Refills: 0
Start: 2021-05-20 | End: 2021-06-23

## 2021-05-20 RX ORDER — TRAMADOL HYDROCHLORIDE 50 MG/1
1 TABLET ORAL
Qty: 42 | Refills: 0
Start: 2021-05-20 | End: 2021-05-26

## 2021-05-20 RX ORDER — CELECOXIB 200 MG/1
1 CAPSULE ORAL
Qty: 28 | Refills: 0
Start: 2021-05-20 | End: 2021-06-02

## 2021-05-20 RX ORDER — PANTOPRAZOLE SODIUM 20 MG/1
1 TABLET, DELAYED RELEASE ORAL
Qty: 30 | Refills: 0
Start: 2021-05-20 | End: 2021-06-18

## 2021-05-20 RX ORDER — SENNA PLUS 8.6 MG/1
2 TABLET ORAL
Qty: 0 | Refills: 0 | DISCHARGE
Start: 2021-05-20

## 2021-05-20 RX ORDER — ACETAMINOPHEN 500 MG
2 TABLET ORAL
Qty: 0 | Refills: 0 | DISCHARGE
Start: 2021-05-20

## 2021-05-20 RX ADMIN — Medication 15 MILLIGRAM(S): at 12:05

## 2021-05-20 RX ADMIN — Medication 15 MILLIGRAM(S): at 06:16

## 2021-05-20 RX ADMIN — Medication 100 MILLIGRAM(S): at 06:15

## 2021-05-20 RX ADMIN — CELECOXIB 200 MILLIGRAM(S): 200 CAPSULE ORAL at 06:16

## 2021-05-20 RX ADMIN — Medication 15 MILLIGRAM(S): at 00:08

## 2021-05-20 RX ADMIN — Medication 81 MILLIGRAM(S): at 06:16

## 2021-05-20 RX ADMIN — CELECOXIB 200 MILLIGRAM(S): 200 CAPSULE ORAL at 06:39

## 2021-05-20 RX ADMIN — Medication 15 MILLIGRAM(S): at 06:39

## 2021-05-20 RX ADMIN — Medication 1 TABLET(S): at 11:41

## 2021-05-20 RX ADMIN — TRAMADOL HYDROCHLORIDE 50 MILLIGRAM(S): 50 TABLET ORAL at 15:50

## 2021-05-20 RX ADMIN — SODIUM CHLORIDE 100 MILLILITER(S): 9 INJECTION INTRAMUSCULAR; INTRAVENOUS; SUBCUTANEOUS at 06:17

## 2021-05-20 RX ADMIN — TRAMADOL HYDROCHLORIDE 50 MILLIGRAM(S): 50 TABLET ORAL at 15:00

## 2021-05-20 RX ADMIN — PANTOPRAZOLE SODIUM 40 MILLIGRAM(S): 20 TABLET, DELAYED RELEASE ORAL at 06:17

## 2021-05-20 RX ADMIN — Medication 15 MILLIGRAM(S): at 01:57

## 2021-05-20 RX ADMIN — Medication 15 MILLIGRAM(S): at 11:41

## 2021-05-20 NOTE — OCCUPATIONAL THERAPY INITIAL EVALUATION ADULT - GENERAL OBSERVATIONS, REHAB EVAL
9:00-9:23; chart reviewed, ok to treat by Occupational Therapist as confirmed by RN, Pt received seated in bedside chair +aquacel right knee +cold pack right knee in NAD.  Pt reported 6/10 pain right knee; however, in agreement with OT IE.

## 2021-05-20 NOTE — OCCUPATIONAL THERAPY INITIAL EVALUATION ADULT - LIVES WITH, PROFILE
lives with daughter and 3 grandchildren in a private house; 6 steps to enter with bilateral handrails, then 15 steps to second floor; +walk-in shower with grab bars/children/other relative

## 2021-05-20 NOTE — OCCUPATIONAL THERAPY INITIAL EVALUATION ADULT - REHAB POTENTIAL, OT EVAL
Pt demonstrated good understanding of home safety and safe adaptive equipment/good, to achieve stated therapy goals

## 2021-05-20 NOTE — DISCHARGE NOTE PROVIDER - CARE PROVIDER_API CALL
Jefry Collins)  Orthopaedic Surgery  [address] [place] [address]  Phone: [phone]  Fax: [phone]  Follow Up Time:

## 2021-05-20 NOTE — OCCUPATIONAL THERAPY INITIAL EVALUATION ADULT - TRANSFER SAFETY CONCERNS NOTED: SHOWER, REHAB EVAL
Discussed with pt to have daughter present for first few walk-in shower transfers/decreased weight-shifting ability

## 2021-05-20 NOTE — DISCHARGE NOTE PROVIDER - CARE PROVIDERS DIRECT ADDRESSES
[email or web address]

## 2021-05-20 NOTE — DISCHARGE NOTE PROVIDER - NSDCHHHOMEBOUNDOTHER_GEN_ALL_CORE_FT
Total Knee Arthoplasty  need for outpatient follow-up/return to ED if symptoms worsen, persist or questions arise

## 2021-05-20 NOTE — DISCHARGE NOTE PROVIDER - HOSPITAL COURSE
80 year old male with a past medical history of glaucoma was admitted for an elective Total Knee Arthoplasty. Mr. Guerra tolerated surgery well with no intra/post operative complications. He was  given intra/post operative antibiotics for infection prophylaxis and will be discharged on Aspirin 81mg BID for 35 days to lower the risk of blood clots. He worked with Physical Therapy while admitted to the hospital and is stable for discharge.

## 2021-05-20 NOTE — PROGRESS NOTE ADULT - SUBJECTIVE AND OBJECTIVE BOX
ORTHO PROGRESS NOTE       80y Male POD #  1    S/P right Total Knee Arthoplasty     Patient seen and examined at bedside . The patient is awake and alert in NAD. No complaints of chest pain, SOB, N/V.    PAST MEDICAL & SURGICAL HISTORY:  Glaucoma (increased eye pressure)  bilateral eyes    Right knee pain    H/O inguinal hernia    Hernia, umbilical    [place] (hard of hearing)    History of tonsillectomy  years ago          MEDICATIONS  (STANDING):  aspirin enteric coated 81 milliGRAM(s) Oral two times a day  celecoxib 200 milliGRAM(s) Oral every 12 hours  chlorhexidine 4% Liquid 1 Application(s) Topical <User Schedule>  ketorolac   Injectable 15 milliGRAM(s) IV Push every 6 hours  latanoprost 0.005% Ophthalmic Solution 1 Drop(s) Both EYES at bedtime  multivitamin 1 Tablet(s) Oral daily  pantoprazole    Tablet 40 milliGRAM(s) Oral before breakfast  senna 2 Tablet(s) Oral at bedtime  sodium chloride 0.9%. 1000 milliLiter(s) (100 mL/Hr) IV Continuous <Continuous>    MEDICATIONS  (PRN):  acetaminophen   Tablet .. 650 milliGRAM(s) Oral every 6 hours PRN Mild Pain (1 - 3)  ALPRAZolam 0.25 milliGRAM(s) Oral two times a day PRN anxiety  aluminum hydroxide/magnesium hydroxide/simethicone Suspension 30 milliLiter(s) Oral four times a day PRN Indigestion  ondansetron Injectable 4 milliGRAM(s) IV Push every 6 hours PRN Nausea and/or Vomiting  traMADol 50 milliGRAM(s) Oral every 4 hours PRN Mild Pain (1 - 3)        Vital Signs Last 24 Hrs  T(C): 36 (20 May 2021 05:34), Max: 37 (19 May 2021 09:47)  T(F): 96.8 (20 May 2021 05:34), Max: 98.6 (19 May 2021 09:47)  HR: 80 (20 May 2021 05:34) (54 - 80)  BP: 150/78 (20 May 2021 05:34) (111/69 - 177/74)  BP(mean): --  RR: 18 (20 May 2021 05:34) (16 - 23)  SpO2: 98% (19 May 2021 17:00) (93% - 99%)                          11.0   10.98 )-----------( 203      ( 20 May 2021 07:27 )             31.3     05-20    136  |  100  |  15  ----------------------------<  110<H>  4.2   |  25  |  0.8    Ca    9.0      20 May 2021 07:27                PE:  The patient was seen and examined at bedside          A&OX3, NAD          Aquacel  dressing C/D/I          Compartments soft         NVI, SILT           A/P:              POD #  1     s/p   right Total Knee Arthoplasty                     OOB to Chair            Physical Therapy- wbat           Pain control - per pain protocol            Incentive Spirometry            DVT Prophylaxis - aspirin                       GI ppx- continue Protonix                   Dispo: home with home care  
 TKA ORTHOPEDIC POST OP CHECK    T(C): 36.5 (05-19-21 @ 15:29), Max: 37 (05-19-21 @ 09:47)  HR: 54 (05-19-21 @ 15:29) (54 - 63)  BP: 124/61 (05-19-21 @ 15:29) (111/69 - 177/74)  RR: 18 (05-19-21 @ 15:29) (18 - 23)  SpO2: 96% (05-19-21 @ 15:29) (93% - 99%)    preop hgb 13.6            S/P TKA ARTHROPLASTY  PAIN CONTROLLED  VSS   A&O X3  DRESSING C/D/I  NVI  ANTIBIOTICS INFUSED  DVT PROPHYLAXIS ORDERED asa   ENCOURAGE INCENTIVE SPIROMETRY  AM LABS  REHAB TO BEGIN pod0  D/C PLANNING

## 2021-05-20 NOTE — DISCHARGE NOTE NURSING/CASE MANAGEMENT/SOCIAL WORK - PATIENT PORTAL LINK FT
You can access the FollowMyHealth Patient Portal offered by [facility] by registering at the following website: [email or web address]. By joining [facility]’s FollowMyHealth portal, you will also be able to view your health information using other applications (apps) compatible with our system.

## 2021-05-20 NOTE — PROGRESS NOTE ADULT - NSICDXPILOT_GEN_ALL_CORE
The patient's daughter, Trinidad, is calling to find out when the patient should stop his Plavix and baby ASA?    The patient will be having a dental procedure on 3/21/17.    Please contact Trinidad to discuss at [phone]  
[place]

## 2021-05-20 NOTE — DISCHARGE NOTE PROVIDER - NSDCMRMEDTOKEN_GEN_ALL_CORE_FT
acetaminophen 325 mg oral tablet: 2 tab(s) orally every 6 hours, As needed, Mild Pain (1 - 3)  aspirin 81 mg oral delayed release tablet: 1 tab(s) orally 2 times a day  celecoxib 200 mg oral capsule: 1 cap(s) orally every 12 hours  Combigan: to each affected eye once a day  pantoprazole 40 mg oral delayed release tablet: 1 tab(s) orally once a day (before a meal)  senna oral tablet: 2 tab(s) orally once a day (at bedtime)  traMADol 50 mg oral tablet: 1 tab(s) orally every 4 hours, As needed, Mild Pain (1 - 3) MDD:6 tablets  Travatan 0.004% ophthalmic solution: 1 drop(s) to each affected eye once a day (in the evening)

## 2021-05-20 NOTE — DISCHARGE NOTE PROVIDER - NSDCFUADDINST_GEN_ALL_CORE_FT
Keep surgical site clean and dry, may remove dressing in 7  days . Call Dr. Edmonds   if any wound drainage, redness , increasing pain, fevers over 101 or if you have any questions or concerns.     You may shower with the bandage on and once it is removed. Once it is removed  , do not scrub surgical site. Do not apply any lotions/moisturizers/creams to surgical site.    Call to make your  post op appointment if you do not have one already.

## 2021-06-02 DIAGNOSIS — F17.290 NICOTINE DEPENDENCE, OTHER TOBACCO PRODUCT, UNCOMPLICATED: ICD-10-CM

## 2021-06-02 DIAGNOSIS — H40.9 UNSPECIFIED GLAUCOMA: ICD-10-CM

## 2021-06-02 DIAGNOSIS — M17.11 UNILATERAL PRIMARY OSTEOARTHRITIS, RIGHT KNEE: ICD-10-CM

## 2021-06-16 DIAGNOSIS — M17.11 UNILATERAL PRIMARY OSTEOARTHRITIS, RIGHT KNEE: ICD-10-CM

## 2022-03-04 NOTE — ASU PREOP CHECKLIST - LAST TOOK
INITIAL EDUCATIONAL ASSESSMENT    Current Grade Level 10th    Type of Classroom regular       School [employer] [facility] [facility]     Attendance Issues: Prior to hospitalization missed 2 days of school.     Description of current school performance: Straight A student per Pt.     Current test results: 504 for ADHD.    Barriers which interfere with academic achievement: patient is a 18-year-old female with past psychiatric history significant for depression, anxiety, and ADHD who presents with worsening suicidal ideation and plan to overdose on pills.  She also endorses several manic symptoms prior to admission including decreased need for sleep, pressured speech, flight of ideas, easy distraction, and more risk-taking behavior.  These may have begun after she took her first dose of Vyvanse. She began smoking marijuana daily at that time.Lottie states she began self-medicating with cannabis in October, initially using edibles, then switching to vape. Patient reports her use increased until she began using cannabis vape nightly in an attempt to control her anxiety and sleep.Lottie reports she began using a new batch of cannabis vape on Tuesday and experienced an increase in symptoms; unable to sleep, eat , focus, and had extreme energy and mood swings. Tried two drinks at parties and denies tobacco and other drugs. Cuts once a week per pt. Good relationship with family.     Current social functioning: Per report pt has a good group of friends. Plays lacrosse and enjoys working out.     Current support services: Seeing a new therapist since Nov.     Other pertinent information: Pt took more than two days to complete school sheets. I was out for two days, and when I returned and asked for it she told me she threw it out. Her school therapist also would not set up a time to talk to me and didn't have a number to reach her. Majority of information is from the report done on 02/28/22 by Dr Drew.     Jacinda  Natasha Clinical  II     3/4/2022                            solids

## 2022-09-19 NOTE — ASU PATIENT PROFILE, ADULT - PATIENT KNOW
Vaccine Information Statement(s) or the Emergency Use Authorization was given today. This has been reviewed, questions answered, and verbal consent given by Patient for injection(s) and administration of Pneumococcal Conjugate (PCV20).        Patient tolerated without incident. See immunization grid for documentation.   yes

## 2023-01-09 NOTE — ASU PREOP CHECKLIST - BP NONINVASIVE DIASTOLIC (MM HG)
Inpatient Rehabilitation  Weekly Team Conference Note  The [address]  [phone]  Patient Name: Yvonne Lopez        MRN: [id number]    : 1934  (82 y.o.)  Gender: female   Referring Practitioner: DO Roger  Diagnosis: [address]  The team conference for this patient was held on 1/10/2023 at 10:30am by:  Elvis Duran DO    Current/Goal QM SCORES  QM Current/Goal Score   Eating CARE Score: 6 / Discharge Goal: Independent   Oral Hygiene CARE Score: 4 / Discharge Goal: Independent   Shower/Bathing CARE Score: 4 / Discharge Goal: Supervision or touching assistance   UB Dressing CARE Score: 5 / Discharge Goal: Independent   LB Dressing CARE Score: 4 / Discharge Goal: Independent   Putting on/off Footwear CARE Score: 3 / Discharge Goal: Independent   Toileting Hygiene CARE Score: 4 / Discharge Goal: Independent   Bladder Continence Bladder Continence: Stress incontinence only    Bowel Continence Bowel Continence: Not rated    Toilet Transfers CARE Score: 4 / Discharge Goal: Independent   Shower/Bathe Self  CARE Score: 4 / Discharge Goal: Supervision or touching assistance   Rolling Left and Right CARE Score: 4 / Discharge Goal: Independent   Sit to Lying CARE Score: 4 / Discharge Goal: Independent   Lying to Sitting on Bedside CARE Score: 4 / Discharge Goal: Independent   Sit to Stand CARE Score: 4 / Discharge Goal: Independent   Chair/Bed to Chair Transfer CARE Score: 4 / Discharge Goal: Independent   Car Transfers CARE Score: 4 / Discharge Goal: Independent   Walk 10 Feet CARE Score: 4 / Discharge Goal: Independent   Walk 50 Feet with Two Turns CARE Score: 4 / Discharge Goal: Independent   Walk 150 Feet CARE Score: 4 / Discharge Goal: Independent   Walk 10 Feet on Uneven Surfaces CARE Score: 4 / Discharge Goal: Independent   1 Step (Curb) CARE Score: 4 / Discharge Goal: Independent   4 Steps CARE Score: 4 / Discharge Goal: Independent   12 77 Steps CARE Score: 4 / Discharge Goal: Independent   Picking up Object from Floor CARE Score: 4 / Discharge Goal: Independent     NURSING:  A&Ox: Level of Consciousness: Alert (0)  Orientation Level: Oriented X4, Oriented to situation, Oriented to time, Oriented to place, Oriented to person  [place] Risk Score: Hugo Total Score: 85  Admission BIMS: 15  Wounds/Incisions/Ulcers: forhead  Medication Review: Y  Pain: Y  Consultations: NO  Imaging: CT head 12/31    Active Comorbid Conditions:HTN, Asthma, Cancer  Systems Review:   Renal: n/a Dialysis: n/a Type: n/a,Frequency: n/a  Neurological: X4  Musculoskeletal: WEAK  Respiratory: WNL  Cardiac/Circulatory/Peripheral Vascular: Venous insufficiency  Abnormal/Relevant Test Results: n/a  Abnormal/Relevant Lab Values:   CBC:   Recent Labs     01/09/23 0620   WBC 4.1   HGB 7.7*   HCT 23.6*   MCV 90.3        BMP:   Recent Labs     01/09/23 0620      K 4.4      CO2 27   BUN 16   CREATININE 1.1   PT/INR: No results for input(s): PROTIME, INR in the last 72 hours. APTT: No results for input(s): APTT in the last 72 hours. Liver Profile:  Lab Results   Component Value Date/Time    AST 21 12/30/2022 08:17 PM    ALT 11 12/30/2022 08:17 PM    BILITOT 0.5 12/30/2022 08:17 PM    ALKPHOS 149 12/30/2022 08:17 PM   No results found for: CHOL, HDL, TRIG  Recent Labs     01/09/23 0620   WBC 4.1   HGB 7.7*   HCT 23.6*      MCV 90.3     Recent Labs     01/09/23 0620      K 4.4      CO2 27   BUN 16   CREATININE 1.1   No results for input(s): AST, ALT, ALB, BILIDIR, BILITOT, ALKPHOS in the last 72 hours. No results for input(s): MG in the last 72 hours. Recent Labs     01/09/23 0620   WBC 4.1   HGB 7.7*   HCT 23.6*        Recent Labs     01/09/23 0620      K 4.4      CO2 27   BUN 16   CREATININE 1.1   CALCIUM 8.0*   No results for input(s): AST, ALT, BILIDIR, BILITOT, ALKPHOS in the last 72 hours.   No results for input(s): INR in the last 72 hours. No results for input(s): Candace Guzman in the last 72 hours. PHYSICAL THERAPY:  Bed Mobility: Scooting: Stand by assistance (seated EOB)    Transfers:  Sit to Stand: Contact guard assistance (at recliner/ EOB/ wc/ commode with RW, VC for hand placement)  Stand to Sit: Contact guard assistance (at recliner/ EOB/ wc/ commode with RW, VC for hand placement)    Ambulation  Surface: Level tile  Device: Rolling Walker  Assistance: Contact guard assistance  Quality of Gait: slight lateral sway with each step, 1 L LOB which she is able to correct with CGA; tends to abandon walker when gets close to chair to sit/goes to sink to wash hands  Gait Deviations: Slow Sandra, Decreased step length, Decreased step height  Distance: 15', 200' (including ascent/descent of 15' ramp), 75', 10'    Stairs  # Steps : 12  Rails: Bilateral  Assistance: Contact guard assistance  Comment: VC for RW management and upright posture, VC to keep both hands on RW instead of reaching for HR with 1 hand on compeltion of ramp    OCCUPATIONAL THERAPY:  ADL  Feeding: Beverage management, Setup  Grooming: Contact guard assistance (stance at sink, oral care, comb hair, no LOB)  Grooming Skilled Clinical Factors: pt washed hands and performed 2 part oral hygiene in stance at sink (teeth brushing + mouthwash), pt required VCs for RW placement and setup of items  UE Bathing: Verbal cueing, Contact guard assistance  UE Bathing Skilled Clinical Factors: Pt sat on tub shower bench and washed BUE and chest with CGA; pt needs VC to wash soap with HH shower hose prior to moving towards next step  LE Bathing:  Moderate assistance  LE Bathing Skilled Clinical Factors: Pt required mod A to wash BLE with inability to reach her feet while seated on tub transfer bench; pt was able to stand for brief interval to clean buttocks with soapy wash cloth and was able to clean tops of thighs + varun area from seated  UE Dressing: Minimal assistance, Increased time to complete  UE Dressing Skilled Clinical Factors: Pt requires min A to thred sleeved knitted ambrosio over RUE ; pt able to pull over head and don LUE but unable to pull down back needing assistance  LE Dressing:  (able to slip on crocs with SBA seated in recliner chair)  LE Dressing Skilled Clinical Factors: Pt doffed briefs from standing level at commode with min A. Pt seated on tub transfer bench was able to doff socks by performing trunk flexion and reaching forward with increased time and CGA. Pt then required max A to don new socks on feet and mod A for initial thredding of briefs and PJ pants to ankles. Pt then performed donning shoes in long leg seated position with BLE supported on couch with min A. Pt required increased time and effort. Pt required rest breaks throughout task. Toileting: Contact guard assistance  Toileting Skilled Clinical Factors: Pt continent of urine but needing min A to discard briefs. Pt performed own varun care from partial stance level with CGA. Pt donned new brief with mod A after bathing routine. Toilet Transfers: Toilet Transfers  Toilet - Technique: Ambulating  Equipment Used: Grab bars  Toilet Transfer: Contact guard assistance  Toilet Transfers Comments: Pt pulling self up with 2WW    Tub/ShowerTransfers:  Tub Transfers  Tub - Transfer From: Walker  Tub - Transfer Type: To and From  Tub - Transfer To: Transfer tub bench  Tub Transfers: Contact guard     SPEECH THERAPY:  Assessment: Speech Therapy Diagnosis  Cognitive Diagnosis: WFL    NUTRITION:  Current Weight: 172 lb (78 kg) BMI (Calculated): 33.7  Current diet order: Current diet and supplement order: ADULT DIET; Regular; Low Fat/Low Chol/High Fiber/2 gm Na     Feeding: Able to feed self      NSG Recorded PO: PO Meals Eaten (%): 76 - 100%    Malnutrition Status Malnutrition Status:  Moderate malnutrition    CASE MANAGEMENT:  Psychosocial/Behavioral Issues:   Assessment:  Pt is from home alone in a mobile home. Pt's son lives in same mobile complex. SW will arrange skilled Home Care and any needed DME. Patient/Family Education provided by team:  Role of PT/OT, Nursing. Patient Goals for Rehab stay:  1. Learn How To Get Up From A Fall  2. Be Independent to go home     Rehab Team Goals for patient for the Upcoming Week:  1. Increase Lloyd with ADLs  2. Increase independence with transfers and ambulation     Barriers to Progress/Attainment of Goals & Efforts/Interventions to remove Barriers:  1. Pt lives alone  2. Decreased balance - continue PT/OT     Discharge Plan:  Estimated Length of Stay: 9 days  Destination: home health  Services at Discharge: [facility], Occupational Therapy, Speech Therapy, and Nursing 3x week  Equipment at Discharge: grab bar shower, sock aid, rw basket with cup orozco  Community Resources:   Factors facilitating achievement of predicted outcomes: Motivated, Cooperative, Pleasant, and Good insight into deficits  Barriers to the achievement of predicted outcomes: No caregiver support    Special Needs in the Upcoming Week:   [x] Family/Caregiver Education  [] Home visit  []Therapeutic Pass [] Consults:_______   [] Family/Caregiver Training  [] Stroke Risk Factor Education     [] Other;_______     TEAM SUMMARY: Will continue with current poc & goals until anticipated d/c date of 1/13/2022.     MD:   Stroke Risk Factors:   [] N/A for this patient [x] HTN  [x]  Diabetes  [x] Hyperlipidemia  [x]Obesity BMI >25  [x] Atrial Fibrillation [] Smoker (current)  [] Smoker (quit in last 12 months)  [] Sleep Apnea [] Other:     Risk for Readmission: Moderate (10-19)    Justification for Continued Stay:   Criteria for continued IRF stay:  Based on my medical assessment of the patient and review of information from the interdisciplinary team, as part of this weekly team conference, the patient continues to meet the following criteria for IRF level of care:  [x] The patient requires 24-hour rehabilitation nursing care   [x] The patient requires an intensive rehabilitation therapy program  [x] The patient requires active and ongoing intervention of multiple therapy disciplines  [x] The patient requires continued physician supervision by a rehabilitation physician  [x] The patient requires an intensive and coordinated interdisciplinary team approach to the delivery of rehabilitative care    Medical Necessity-continued close physician medical management is required for:   [] Cardiac/Circulatory dysfunction  [] Respiratory/Pulmonary dysfunction  [] Integumentary complications  [] Peripheral Vascular dysfunction  [] Musculoskeletal dysfunction  [x] Neurological dysfunction d/t:  [x] CVA  [] SCI  [] TBI  [] Other: __________  [] Renal dysfunction  [] Hematologic dysfunction    [] Endocrine disorders  [] GI disorders     [] Genito-Urinary dysfunction    Assessment/Plan:  [x] The patient is making good progression towards their LTG's, is actively participating in, and has a reasonable expectation to continue to benefit from the intensive rehabilitation program.  [] The estimated discharge date has been changed from initial team conference due to:   [] The estimated discharge destination has been changed from initial team conference due to:     Rehab Team Members in attendance for Team Conference:  ARU Supervisor/PPS Coordinator:  Mago Vale, PT, DPT    Therapy Manager/ARU :  Andreea Ibanez, PT, DPT    Social Work:  [facility]    Nursing:  Luciano Hall, KWABENA    Therapy:  Swathi Hogan, PT  Jessica Rios, PT, DPT  Brea Orona PT, DPT     Rashida Iqbal, OTR/L  Pita Arredondo, OTR/L  Marisol Dunn, [address] [place] [address], [address] [place] [address]    Nutrition:  Esmer Galdamez RD:    [place]: [phone]  Office:  [phone]    I approve the established interdisciplinary plan of care as documented within the medical record of Elizabeth Garcia D.O. MANFRED  PM&R  1/10/2023  11:40 AM

## 2023-05-02 ENCOUNTER — OUTPATIENT (OUTPATIENT)
Dept: OUTPATIENT SERVICES | Facility: HOSPITAL | Age: 83
LOS: 1 days | End: 2023-05-02
Payer: MEDICARE

## 2023-05-02 ENCOUNTER — APPOINTMENT (OUTPATIENT)
Dept: OPHTHALMOLOGY | Facility: CLINIC | Age: 83
End: 2023-05-02
Payer: MEDICARE

## 2023-05-02 DIAGNOSIS — H25.89 OTHER AGE-RELATED CATARACT: ICD-10-CM

## 2023-05-02 DIAGNOSIS — Z90.89 ACQUIRED ABSENCE OF OTHER ORGANS: Chronic | ICD-10-CM

## 2023-05-02 PROCEDURE — 99202 OFFICE O/P NEW SF 15 MIN: CPT

## 2023-05-02 PROCEDURE — 99212 OFFICE O/P EST SF 10 MIN: CPT

## 2023-05-05 ENCOUNTER — OUTPATIENT (OUTPATIENT)
Dept: OUTPATIENT SERVICES | Facility: HOSPITAL | Age: 83
LOS: 1 days | End: 2023-05-05
Payer: MEDICARE

## 2023-05-05 ENCOUNTER — APPOINTMENT (OUTPATIENT)
Dept: OPHTHALMOLOGY | Facility: CLINIC | Age: 83
End: 2023-05-05
Payer: MEDICARE

## 2023-05-05 DIAGNOSIS — H53.8 OTHER VISUAL DISTURBANCES: ICD-10-CM

## 2023-05-05 DIAGNOSIS — Z90.89 ACQUIRED ABSENCE OF OTHER ORGANS: Chronic | ICD-10-CM

## 2023-05-05 PROCEDURE — 99212 OFFICE O/P EST SF 10 MIN: CPT

## 2023-05-05 PROCEDURE — 76519 ECHO EXAM OF EYE: CPT | Mod: 26

## 2023-05-05 PROCEDURE — 76516 ECHO EXAM OF EYE: CPT

## 2023-05-05 PROCEDURE — 99212 OFFICE O/P EST SF 10 MIN: CPT | Mod: 25

## 2023-05-10 DIAGNOSIS — H25.13 AGE-RELATED NUCLEAR CATARACT, BILATERAL: ICD-10-CM

## 2023-05-12 DIAGNOSIS — H40.2234 CHRONIC ANGLE-CLOSURE GLAUCOMA, BILATERAL, INDETERMINATE STAGE: ICD-10-CM

## 2023-05-12 DIAGNOSIS — Z87.2 PERSONAL HISTORY OF DISEASES OF THE SKIN AND SUBCUTANEOUS TISSUE: ICD-10-CM

## 2023-05-12 DIAGNOSIS — H25.13 AGE-RELATED NUCLEAR CATARACT, BILATERAL: ICD-10-CM

## 2023-06-12 ENCOUNTER — OUTPATIENT (OUTPATIENT)
Dept: INPATIENT UNIT | Facility: HOSPITAL | Age: 83
LOS: 1 days | Discharge: HOME | End: 2023-06-12
Payer: MEDICARE

## 2023-06-12 VITALS — SYSTOLIC BLOOD PRESSURE: 167 MMHG | RESPIRATION RATE: 15 BRPM | HEART RATE: 60 BPM | DIASTOLIC BLOOD PRESSURE: 74 MMHG

## 2023-06-12 VITALS
HEART RATE: 66 BPM | WEIGHT: 149.91 LBS | TEMPERATURE: 97 F | DIASTOLIC BLOOD PRESSURE: 85 MMHG | SYSTOLIC BLOOD PRESSURE: 175 MMHG | RESPIRATION RATE: 17 BRPM | OXYGEN SATURATION: 98 % | HEIGHT: 65 IN

## 2023-06-12 DIAGNOSIS — Z96.651 PRESENCE OF RIGHT ARTIFICIAL KNEE JOINT: Chronic | ICD-10-CM

## 2023-06-12 DIAGNOSIS — Z98.890 OTHER SPECIFIED POSTPROCEDURAL STATES: Chronic | ICD-10-CM

## 2023-06-12 DIAGNOSIS — H25.042 POSTERIOR SUBCAPSULAR POLAR AGE-RELATED CATARACT, LEFT EYE: ICD-10-CM

## 2023-06-12 DIAGNOSIS — Z90.89 ACQUIRED ABSENCE OF OTHER ORGANS: Chronic | ICD-10-CM

## 2023-06-12 PROCEDURE — V2632: CPT

## 2023-06-12 RX ORDER — BRIMONIDINE TARTRATE, TIMOLOL MALEATE 2; 5 MG/ML; MG/ML
0 SOLUTION/ DROPS OPHTHALMIC
Qty: 0 | Refills: 0 | DISCHARGE

## 2023-06-12 NOTE — ASU PATIENT PROFILE, ADULT - VISION (WITH CORRECTIVE LENSES IF THE PATIENT USUALLY WEARS THEM):
please see ED provider note for HPI/ROS/PE
Normal vision: sees adequately in most situations; can see medication labels, newsprint

## 2023-06-12 NOTE — ASU PATIENT PROFILE, ADULT - NSICDXPASTMEDICALHX_GEN_ALL_CORE_FT
PAST MEDICAL HISTORY:  Glaucoma (increased eye pressure) bilateral eyes    H/O inguinal hernia     Hernia, umbilical     [place] (hard of hearing)     Right knee pain

## 2023-06-12 NOTE — ASU PATIENT PROFILE, ADULT - NSICDXPASTSURGICALHX_GEN_ALL_CORE_FT
PAST SURGICAL HISTORY:  H/O hernia repair     H/O total knee replacement, right     History of tonsillectomy years ago

## 2023-06-15 DIAGNOSIS — H26.9 UNSPECIFIED CATARACT: ICD-10-CM

## 2023-06-15 DIAGNOSIS — H57.09 OTHER ANOMALIES OF PUPILLARY FUNCTION: ICD-10-CM

## 2023-06-15 DIAGNOSIS — Z96.651 PRESENCE OF RIGHT ARTIFICIAL KNEE JOINT: ICD-10-CM

## 2023-09-21 ASSESSMENT — KOOS JR
STANDING UPRIGHT: MODERATE
GOING UP OR DOWN STAIRS: SEVERE
KOOS JR RAW SCORE: 18
TWISING OR PIVOTING ON KNEE: SEVERE
HOW SEVERE IS YOUR KNEE STIFFNESS AFTER FIRST WAKING IN MORNING: SEVERE
IMPORTED KOOS JR SCORE: 18.0
IMPORTED FORM: YES
BENDING TO THE FLOOR TO PICK UP OBJECT: MODERATE
RISING FROM SITTING: MODERATE
STRAIGHTENING KNEE FULLY: SEVERE

## 2023-10-02 ENCOUNTER — OUTPATIENT (OUTPATIENT)
Dept: OUTPATIENT SERVICES | Facility: HOSPITAL | Age: 83
LOS: 1 days | Discharge: HOME | End: 2023-10-02
Payer: MEDICARE

## 2023-10-02 VITALS — SYSTOLIC BLOOD PRESSURE: 155 MMHG | RESPIRATION RATE: 15 BRPM | HEART RATE: 62 BPM | DIASTOLIC BLOOD PRESSURE: 74 MMHG

## 2023-10-02 VITALS
HEIGHT: 75 IN | TEMPERATURE: 98 F | DIASTOLIC BLOOD PRESSURE: 87 MMHG | WEIGHT: 149.91 LBS | OXYGEN SATURATION: 97 % | SYSTOLIC BLOOD PRESSURE: 187 MMHG | HEART RATE: 64 BPM | RESPIRATION RATE: 17 BRPM

## 2023-10-02 DIAGNOSIS — Z90.89 ACQUIRED ABSENCE OF OTHER ORGANS: Chronic | ICD-10-CM

## 2023-10-02 DIAGNOSIS — H25.11 AGE-RELATED NUCLEAR CATARACT, RIGHT EYE: ICD-10-CM

## 2023-10-02 DIAGNOSIS — Z98.890 OTHER SPECIFIED POSTPROCEDURAL STATES: Chronic | ICD-10-CM

## 2023-10-02 DIAGNOSIS — Z96.651 PRESENCE OF RIGHT ARTIFICIAL KNEE JOINT: Chronic | ICD-10-CM

## 2023-10-02 DIAGNOSIS — H25.041 POSTERIOR SUBCAPSULAR POLAR AGE-RELATED CATARACT, RIGHT EYE: ICD-10-CM

## 2023-10-02 DIAGNOSIS — H21.541 POSTERIOR SYNECHIAE (IRIS), RIGHT EYE: ICD-10-CM

## 2023-10-02 DIAGNOSIS — Z96.1 PRESENCE OF INTRAOCULAR LENS: Chronic | ICD-10-CM

## 2023-10-02 DIAGNOSIS — H57.03 MIOSIS: ICD-10-CM

## 2023-10-02 PROCEDURE — V2632: CPT

## 2023-10-02 RX ORDER — LATANOPROST 0.05 MG/ML
1 SOLUTION/ DROPS OPHTHALMIC; TOPICAL
Refills: 0 | DISCHARGE

## 2023-10-02 RX ORDER — TRAVOPROST 0.04 MG/ML
1 SOLUTION/ DROPS OPHTHALMIC
Qty: 0 | Refills: 0 | DISCHARGE

## 2023-10-02 RX ORDER — DORZOLAMIDE HYDROCHLORIDE 20 MG/ML
1 SOLUTION/ DROPS OPHTHALMIC
Refills: 0 | DISCHARGE

## 2023-10-02 NOTE — ASU PATIENT PROFILE, ADULT - NSICDXPASTMEDICALHX_GEN_ALL_CORE_FT
PAST MEDICAL HISTORY:  Glaucoma (increased eye pressure) bilateral eyes    H/O inguinal hernia     Hernia, umbilical     [place] (hard of hearing)     Right knee pain

## 2023-10-02 NOTE — ASU PATIENT PROFILE, ADULT - NSICDXPASTSURGICALHX_GEN_ALL_CORE_FT
PAST SURGICAL HISTORY:  H/O hernia repair     H/O total knee replacement, right     History of intraocular lens implant     History of tonsillectomy years ago

## 2023-10-02 NOTE — ASU PATIENT PROFILE, ADULT - FALL HARM RISK - UNIVERSAL INTERVENTIONS
Bed in lowest position, wheels locked, appropriate side rails in place/Call bell, personal items and telephone in reach/Instruct patient to call for assistance before getting out of bed or chair/Non-slip footwear when patient is out of bed/[place] to call system/Physically safe environment - no spills, clutter or unnecessary equipment/Purposeful Proactive Rounding/Room/bathroom lighting operational, light cord in reach

## 2023-12-15 NOTE — ASU PREOP CHECKLIST - HEIGHT IN INCHES
I spoke with patient and discussed his CT results with him.  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  Patient also had some renal cysts.  Patient has an appointment with his urologist in the next few days.  I recommended patient have a CT with IV contrast and he is agreeable to this plan I have placed the order in his chart.  
5

## 2024-01-09 ENCOUNTER — RESULT CHARGE (OUTPATIENT)
Age: 84
End: 2024-01-09

## 2024-01-09 ENCOUNTER — APPOINTMENT (OUTPATIENT)
Dept: ORTHOPEDIC SURGERY | Facility: CLINIC | Age: 84
End: 2024-01-09
Payer: MEDICARE

## 2024-01-09 DIAGNOSIS — M54.41 LUMBAGO WITH SCIATICA, LEFT SIDE: ICD-10-CM

## 2024-01-09 DIAGNOSIS — G89.29 LUMBAGO WITH SCIATICA, LEFT SIDE: ICD-10-CM

## 2024-01-09 DIAGNOSIS — M54.42 LUMBAGO WITH SCIATICA, LEFT SIDE: ICD-10-CM

## 2024-01-09 PROCEDURE — 73560 X-RAY EXAM OF KNEE 1 OR 2: CPT | Mod: 50

## 2024-01-09 PROCEDURE — 99214 OFFICE O/P EST MOD 30 MIN: CPT

## 2024-01-09 PROCEDURE — 73521 X-RAY EXAM HIPS BI 2 VIEWS: CPT

## 2024-01-09 PROCEDURE — 99204 OFFICE O/P NEW MOD 45 MIN: CPT

## 2024-01-09 NOTE — HISTORY OF PRESENT ILLNESS
[de-identified] : f/u of knees/hips had tka in 2021, doing fine with that has some pain he is not sure if it is from back or hips he descibes symptoms more consistent with low back also his gait looks altered from what i remember reports some falls  on exam no pain with prom of knee no instability neuro: reflexes brisk MRI of back ordered will f/u with dr orr after mri

## 2024-01-19 ENCOUNTER — APPOINTMENT (OUTPATIENT)
Dept: ORTHOPEDIC SURGERY | Facility: CLINIC | Age: 84
End: 2024-01-19

## 2024-01-30 ENCOUNTER — APPOINTMENT (OUTPATIENT)
Dept: ORTHOPEDIC SURGERY | Facility: CLINIC | Age: 84
End: 2024-01-30

## 2024-11-07 NOTE — ASU PATIENT PROFILE, ADULT - MEDICATIONS BROUGHT TO HOSPITAL, PROFILE
Karmen, thanks for seeing this gia, 
no

## 2025-01-22 ENCOUNTER — NON-APPOINTMENT (OUTPATIENT)
Age: 85
End: 2025-01-22

## 2025-01-22 ENCOUNTER — APPOINTMENT (OUTPATIENT)
Facility: CLINIC | Age: 85
End: 2025-01-22
Payer: MEDICARE

## 2025-01-22 PROCEDURE — 99204 OFFICE O/P NEW MOD 45 MIN: CPT

## 2025-01-22 PROCEDURE — 92134 CPTRZ OPH DX IMG PST SGM RTA: CPT

## 2025-01-22 PROCEDURE — 99214 OFFICE O/P EST MOD 30 MIN: CPT

## 2025-01-22 PROCEDURE — 92202 OPSCPY EXTND ON/MAC DRAW: CPT

## 2025-07-30 ENCOUNTER — APPOINTMENT (OUTPATIENT)
Facility: CLINIC | Age: 85
End: 2025-07-30

## 2025-09-11 ENCOUNTER — APPOINTMENT (OUTPATIENT)
Dept: ORTHOPEDIC SURGERY | Facility: CLINIC | Age: 85
End: 2025-09-11
Payer: MEDICARE

## 2025-09-11 DIAGNOSIS — G89.29 LUMBAGO WITH SCIATICA, LEFT SIDE: ICD-10-CM

## 2025-09-11 DIAGNOSIS — M54.42 LUMBAGO WITH SCIATICA, LEFT SIDE: ICD-10-CM

## 2025-09-11 DIAGNOSIS — M54.41 LUMBAGO WITH SCIATICA, LEFT SIDE: ICD-10-CM

## 2025-09-11 PROCEDURE — 99203 OFFICE O/P NEW LOW 30 MIN: CPT

## 2025-09-11 PROCEDURE — 72110 X-RAY EXAM L-2 SPINE 4/>VWS: CPT
